# Patient Record
Sex: FEMALE | Employment: OTHER | URBAN - METROPOLITAN AREA
[De-identification: names, ages, dates, MRNs, and addresses within clinical notes are randomized per-mention and may not be internally consistent; named-entity substitution may affect disease eponyms.]

---

## 2021-05-21 ENCOUNTER — TRANSFERRED RECORDS (OUTPATIENT)
Dept: HEALTH INFORMATION MANAGEMENT | Facility: CLINIC | Age: 71
End: 2021-05-21

## 2021-05-24 ENCOUNTER — TELEPHONE (OUTPATIENT)
Dept: OPHTHALMOLOGY | Facility: CLINIC | Age: 71
End: 2021-05-24

## 2021-05-25 ENCOUNTER — OFFICE VISIT (OUTPATIENT)
Dept: OPHTHALMOLOGY | Facility: CLINIC | Age: 71
End: 2021-05-25
Attending: OPHTHALMOLOGY
Payer: MEDICARE

## 2021-05-25 DIAGNOSIS — H50.112 EXOTROPIA OF LEFT EYE: ICD-10-CM

## 2021-05-25 DIAGNOSIS — H21.562 PUPILLARY ABNORMALITY, LEFT: ICD-10-CM

## 2021-05-25 DIAGNOSIS — H53.2 DIPLOPIA: ICD-10-CM

## 2021-05-25 DIAGNOSIS — H50.22 HYPERTROPIA OF LEFT EYE: ICD-10-CM

## 2021-05-25 DIAGNOSIS — H50.012 ESOTROPIA OF LEFT EYE: Primary | ICD-10-CM

## 2021-05-25 DIAGNOSIS — H35.82 OCULAR ISCHEMIC SYNDROME: ICD-10-CM

## 2021-05-25 PROCEDURE — G0463 HOSPITAL OUTPT CLINIC VISIT: HCPCS | Mod: 25

## 2021-05-25 PROCEDURE — 92060 SENSORIMOTOR EXAMINATION: CPT | Performed by: OPHTHALMOLOGY

## 2021-05-25 PROCEDURE — 92015 DETERMINE REFRACTIVE STATE: CPT | Mod: GY

## 2021-05-25 PROCEDURE — 92004 COMPRE OPH EXAM NEW PT 1/>: CPT | Performed by: OPHTHALMOLOGY

## 2021-05-25 RX ORDER — CARBOXYMETHYLCELLULOSE SODIUM 5 MG/ML
1 SOLUTION/ DROPS OPHTHALMIC
COMMUNITY

## 2021-05-25 RX ORDER — ALBUTEROL SULFATE 90 UG/1
AEROSOL, METERED RESPIRATORY (INHALATION)
COMMUNITY
Start: 2021-05-12

## 2021-05-25 ASSESSMENT — REFRACTION_FINALRX
OD_VPRISM: 1
OD_HBASE: IN
OS_HBASE: OUT
OD_HBASE: OUT
OS_HPRISM: 2
OD_HPRISM: 3.5
OS_HBASE: IN
OS_VPRISM: 1
OS_HPRISM: 3.5
OD_HPRISM: 2

## 2021-05-25 ASSESSMENT — REFRACTION_MANIFEST
OS_CYLINDER: SPHERE
OD_SPHERE: -1.00
OD_AXIS: 180
OD_CYLINDER: +1.00
OS_SPHERE: -0.50

## 2021-05-25 ASSESSMENT — VISUAL ACUITY
OS_CC+: +
CORRECTION_TYPE: GLASSES
METHOD: SNELLEN - LINEAR
OD_CC: 20/20
OD_CC+: -2
OS_CC: 20/25

## 2021-05-25 ASSESSMENT — TONOMETRY
OS_IOP_MMHG: 11
OD_IOP_MMHG: 9

## 2021-05-25 ASSESSMENT — CONF VISUAL FIELD
METHOD: COUNTING FINGERS
OD_NORMAL: 1
OS_NORMAL: 1

## 2021-05-25 ASSESSMENT — REFRACTION_WEARINGRX
OS_CYLINDER: +1.25
OS_ADD: +2.75
OS_VPRISM: 1BD
OD_AXIS: 175
OD_SPHERE: -0.75
SPECS_TYPE: PAL
OD_CYLINDER: +1.25
OD_ADD: +2.75
OS_SPHERE: -0.50
OS_AXIS: 175

## 2021-05-25 ASSESSMENT — PATIENT HEALTH QUESTIONNAIRE - PHQ9: SUM OF ALL RESPONSES TO PHQ QUESTIONS 1-9: 13

## 2021-05-25 NOTE — LETTER
2021    Tata Alvarado, OD  [facility]  [address]  [place] [address]  Via Mail        RE:  MRN:  : Tasha Santana  [id number]  1950     Dear Dr. Alvarado:    It was my pleasure to examine Tasha Santana on 2021 at the [facility]. Please find my assessment and recommendations below. I have also attached the findings from today's examination to the end of this note for your records.    Thank you for the opportunity to participate in Tasha Santana's care. If you would like to discuss anything further, please do not hesitate to contact me.   Sincerely,    MD QUIN Diggs NP    Tasha Santana  [address]  [place] [address]  Via Mail      Base Eye Exam     Visual Acuity (Snellen - Linear)       Right Left    Dist cc 20/20 -2 20/25 +    Correction: Glasses          Tonometry (ICare B/S gw, 8:53 AM)       Right Left    Pressure 9 11          Pupils       Dark Light Shape React APD    Right 3 2 Round Brisk None    Left 4 3 Round Minimal Yes   Agree, APD LE           Visual Fields (Counting fingers)       Left Right     Full Full          Neuro/Psych     Oriented x3: Yes    Mood/Affect: Normal          Dilation     Both eyes: 1.0% Mydriacyl @ 10:00 AM            Additional Tests     Danie     Equal          Stereo     Fly: +    Animals: 3/3    Circles: 4/9          [place] Dat       Right Left    Primary 0 0            Strabismus Exam     Reading #1   (Edited by: Eve Choudhury)    Method: Alternate cover    Correction: cc    Distance Near Near +3DS N Bifocals     X' 4       LHT 2        0 0 0  ET 6 0  0 0                      ET 6 0  0  ET 6 0  0  ET 6          LHT 2     LHT 4      0 0 0  LET 6 - trace 0 0                 R Tilt L Tilt   ET 4 ET 4   LHT 2 LHT 2         Nystagmus: Yes: Pronounced end gaze nystagmus  AHP: None          Reading #2   (Edited by: Ashlyn Soares, [place])    Method:  Alternate cover     Correction: cc    Distance Near Near +3DS N Bifocals       LXT  7             0 0 0  LET 4 0 0 0            LHT 2          LET 6 0  0  LET 4 0  0  LET 6     RHT 2     LHT 2     LHT 4      0 0 0  LET 4 0 0 0          LHT 2       R Tilt L Tilt   ET 4 ET 4   LHT 2          Nystagmus: Yes: End gaze nystagmus AHP: None          Comments    2- Ashlyn Soares, [place]  Sc : 4 LET, 3 LHT     sc : fuses with 4 PACO and 2 BU RE at distance    SMR: 0.5 LHT    Patient describes difficulty seeing near targets with central vision. Normal amsler grid.   Diplopia resolves with 7 BI at near             Refraction     Wearing Rx       Sphere Cylinder Axis Add Vert Prism    Right -0.75 +1.25 175 +2.75     Left -0.50 +1.25 175 +2.75 1BD    Age: 1yr+    Type: PAL          Manifest Refraction       Sphere Cylinder [place] Dist VA    Right -1.00 +1.00 180 20/20    Left -0.50 Sphere  20/20-3          Final Rx       Sphere Cylinder Axis Dist VA Add Horz Prism Vert Prism    Right -1.00 +1.00 180 20/20 +3.00 2 out 1 up    Left -0.50 Sphere  20/20-3 +3.00 2 out 1 down          Final Rx #2       Sphere Cylinder Axis Dist VA Add Horz Prism Vert Prism    Right +2.00 +1.00 180   3.5 in     Left +2.50 Sphere    3.5 in     Comments: Reading glasses           Final Rx Comments    Aye Cruz MD  Pediatric Ophthalmology & Strabismus  Department of Ophthalmology & Visual Neurosciences  [facility]

## 2021-05-25 NOTE — NURSING NOTE
Chief Complaint(s) and History of Present Illness(es)     Diplopia Evaluation     Laterality: both eyes    Onset: chronic    Quality: horizontal    Associated symptoms: headaches.  Negative for blurred vision and eye pain    Treatments tried: glasses    Comments: Horiz/vertical intermittent diplopia at D/N began after CE BE. Resolves with monocular closure. PG has prism. Recent MRA on Carotid artery showing blocked L side. +dryness. +HA x 3 weeks.              Comments     Referred by Dr. Alvarado [facility] [place], [place], letter sent on 5/21/21 reviewed.   Diplopia has progressively gotten worse over 2-3 years. Started after cataract removal BE 2019. Described as horizontal with a small vertical component, D/N, constant.    PG has prism, has gotten used to it, but feels that it has not made the diplopia better at all. Resolves with monocular closure.   Recent MRA on carotid arteries, reports that L side is 90% blocked. HA experienced around temporal LE brow bone x 3 weeks.    Uses Refresh ATs 2-3x day, punctual tubes, NI dryness.     H/o BRVO, peripheral retinal hemorrhages LE, CE BE.

## 2021-05-25 NOTE — LETTER
"2021    Tata Alvarado, OD  [facility] [place] [facility]  [address], [address]  [place] [address]       RE:  MRN:  : Tasha Santana  [id number]  1950     Dear Dr. Alvarado:    It was my pleasure to examine Tasha Santana on 2021 at the [facility]. Please find my assessment and recommendations below. I have also attached the findings from today's examination to the end of this note for your records.    Chief Complaints and History of Present Illnesses   Patient presents with     Diplopia Evaluation     Horiz/vertical intermittent diplopia at D/N began after CE BE. Resolves with monocular closure. PG has prism. Recent MRA on Carotid artery showing blocked L side. +dryness. +HA x 3 weeks.   Review of systems for the eyes was negative other than the pertinent positives and negatives noted in the HPI.  History is obtained from the patient and brother.    Referring provider: Tata Alvarado    Primary care: No Ref-Primary, Physician   Assessment   Tasha Santana is a 70-year-old female who presents with:       ICD-10-CM    1. Esotropia of left eye  H50.00 Sensorimotor   2. 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 Exotropia of left eye  H50.10 Sensorimotor   4. Diplopia  H53.2    5. Pupillary abnormality, left  H21.562    6. Ocular ischemic syndrome  H35.82        Plan  Ms. Santana has small angle esotropia at distance and small angle exotropia at near.  She has mildly decreased vision and trace [employer] likely secondary to left ocular ischemic syndrome (s/p carotid endarterectomy).  I do not have access to her previous imaging, but she should have a brain and orbit MRI with and without contrast if this has not already been done.   We will try giving 2 different prism glasses prescription, one for near work/reading and one for distance.  F/u 3-4 months.     Further details of the management plan can be found in the \"Patient Instructions\" section " which was printed and given to the patient at checkout.  Return in about 3 months (around 8/25/2021) for 3-4 month undilated exam with Dr. Cruz.   Attending Physician Attestation:  Complete documentation of historical and exam elements from today's encounter can be found in the full encounter summary report (not reduplicated in this progress note).  I personally obtained the chief complaint(s) and history of present illness.  I confirmed and edited as necessary the review of systems, past medical/surgical history, family history, social history, and examination findings as documented by others; and I examined the patient myself.  I personally reviewed the relevant tests, images, and reports as documented above.  I formulated and edited as necessary the assessment and plan and discussed the findings and management plan with the patient and family. - Aye Cruz MD 6/12/2021 1:49 PM     Thank you for the opportunity to participate in Tasha Santana's care. If you would like to discuss anything further, please do not hesitate to contact me. I have asked Tasha Santana to return in about 3 months (around 8/25/2021) for 3-4 month undilated exam with Dr. Cruz.    Sincerely,    Aye Cruz MD    CC  [facility]  [address]  [place] [address]  Via Mail      Base Eye Exam     Visual Acuity (Snellen - Linear)       Right Left    Dist cc 20/20 -2 20/25 +    Correction: Glasses          Tonometry (ICare B/S gw, 8:53 AM)       Right Left    Pressure 9 11          Pupils       Dark Light Shape React APD    Right 3 2 Round Brisk None    Left 4 3 Round Minimal Yes   Agree, APD LE           Visual Fields (Counting fingers)       Left Right     Full Full          Neuro/Psych     Oriented x3: Yes    Mood/Affect: Normal          Dilation     Both eyes: 1.0% Mydriacyl @ 10:00 AM            Additional Tests     Danie     Equal          Stereo     Fly: +    Animals: 3/3    Circles: 4/9           [place] Dat       Right Left    Primary 0 0            Strabismus Exam     Reading #1   (Edited by: Eve Choudhury)    Method: Alternate cover    Correction: cc    Distance Near Near +3DS N Bifocals     X' 4       LHT 2        0 0 0  ET 6 0  0 0                      ET 6 0  0  ET 6 0  0  ET 6          LHT 2     LHT 4      0 0 0  LET 6 - trace 0 0                 R Tilt L Tilt   ET 4 ET 4   LHT 2 LHT 2         Nystagmus: Yes: Pronounced end gaze nystagmus  AHP: None          Reading #2   (Edited by: Ashlyn Soares [place])    Method: Alternate cover     Correction: cc    Distance Near Near +3DS N Bifocals       LXT  7             0 0 0  LET 4 0 0 0            LHT 2          LET 6 0  0  LET 4 0  0  LET 6     RHT 2     LHT 2     LHT 4      0 0 0  LET 4 0 0 0          LHT 2       R Tilt L Tilt   ET 4 ET 4   LHT 2          Nystagmus: Yes: End gaze nystagmus AHP: None          Comments    2- KAVITHA Mccartney  Sc : 4 LET, 3 LHT     sc : fuses with 4 PACO and 2 BU RE at distance    SMR: 0.5 LHT    Patient describes difficulty seeing near targets with central vision. Normal amsler grid.   Diplopia resolves with 7 BI at near             Slit Lamp and Fundus Exam     Slit Lamp Exam       Right Left    Lids/Lashes MGD MGD    Conjunctiva/Sclera White and quiet White and quiet    Cornea Clear Clear    Anterior Chamber Deep and quiet Deep and quiet    Iris Round and reactive Round and reactive    Lens PCIOL PCIOL    Vitreous Normal Normal          Fundus Exam       Right Left    Disc Normal Normal    Macula attenuated attenuated    Vessels Normal Normal    Periphery Normal Normal            Refraction     Wearing Rx       Sphere Cylinder Axis Add Vert Prism    Right -0.75 +1.25 175 +2.75     Left -0.50 +1.25 175 +2.75 1BD    Age: 1yr+    Type: PAL          Manifest Refraction       Sphere Cylinder [place] Dist VA    Right -1.00 +1.00 180 20/20    Left -0.50 Sphere  20/20-3          Final Rx       Sphere  Cylinder [place] Dist VA Add Horz Prism Vert Prism    Right -1.00 +1.00 180 20/20 +3.00 2 out 1 up    Left -0.50 Sphere  20/20-3 +3.00 2 out 1 down          Final Rx #2       Sphere Cylinder Axis Dist VA Add Horz Prism Vert Prism    Right +2.00 +1.00 180   3.5 in     Left +2.50 Sphere    3.5 in     Comments: Reading glasses           Final Rx Comments    Aye Cruz MD  Pediatric Ophthalmology & Strabismus  Department of Ophthalmology & Visual Neurosciences  [facility]

## 2021-06-07 ASSESSMENT — SLIT LAMP EXAM - LIDS
COMMENTS: MGD
COMMENTS: MGD

## 2021-06-07 NOTE — PROGRESS NOTES
"Chief Complaints and History of Present Illnesses   Patient presents with     Diplopia Evaluation     Horiz/vertical intermittent diplopia at D/N began after CE BE. Resolves with monocular closure. PG has prism. Recent MRA on Carotid artery showing blocked L side. +dryness. +HA x 3 weeks.   Review of systems for the eyes was negative other than the pertinent positives and negatives noted in the HPI.  History is obtained from the patient and brother.    Referring provider: Tata Alvarado    Primary care: No Ref-Primary, Physician   Assessment   Tasha Santana is a 70 year old female who presents with:       ICD-10-CM    1. Esotropia of left eye  H50.00 Sensorimotor   2. 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 Exotropia of left eye  H50.10 Sensorimotor   4. Diplopia  H53.2    5. Pupillary abnormality, left  H21.562    6. Ocular ischemic syndrome  H35.82          Plan  Ms. Santana has small angle esotropia at distance and small angle exotropia at near.  She has mildly decreased vision and trace [employer] likely secondary to left ocular ischemic syndrome (s/p carotid endarterectomy)  I do not have access to her previous imaging, but she should have a brain and orbit MRI with and without contrast if this has not already been done.   We will try giving 2 different prism glasses prescription, one for near work/reading and 1 for distance.  F/u 3-4 months        Further details of the management plan can be found in the \"Patient Instructions\" section which was printed and given to the patient at checkout.  Return in about 3 months (around 8/25/2021) for 3-4 month undilated exam with Dr. Cruz.   Attending Physician Attestation:  Complete documentation of historical and exam elements from today's encounter can be found in the full encounter summary report (not reduplicated in this progress note).  I personally obtained the chief complaint(s) and history of present illness.  I confirmed and edited as necessary the " review of systems, past medical/surgical history, family history, social history, and examination findings as documented by others; and I examined the patient myself.  I personally reviewed the relevant tests, images, and reports as documented above.  I formulated and edited as necessary the assessment and plan and discussed the findings and management plan with the patient and family. - Aye Cruz MD 6/12/2021 1:49 PM

## 2021-06-15 ENCOUNTER — TRANSFERRED RECORDS (OUTPATIENT)
Dept: HEALTH INFORMATION MANAGEMENT | Facility: CLINIC | Age: 71
End: 2021-06-15